# Patient Record
Sex: MALE | Race: WHITE | Employment: FULL TIME | URBAN - METROPOLITAN AREA
[De-identification: names, ages, dates, MRNs, and addresses within clinical notes are randomized per-mention and may not be internally consistent; named-entity substitution may affect disease eponyms.]

---

## 2017-09-08 PROBLEM — E78.5 HYPERLIPIDEMIA: Status: ACTIVE | Noted: 2017-09-08

## 2017-09-08 PROBLEM — I48.91 ATRIAL FIBRILLATION (HCC): Status: ACTIVE | Noted: 2017-09-08

## 2017-09-08 PROBLEM — I10 HYPERTENSION: Status: ACTIVE | Noted: 2017-09-08

## 2018-03-23 ENCOUNTER — OFFICE VISIT (OUTPATIENT)
Dept: CARDIOLOGY CLINIC | Age: 48
End: 2018-03-23
Payer: COMMERCIAL

## 2018-03-23 VITALS
HEIGHT: 68 IN | DIASTOLIC BLOOD PRESSURE: 80 MMHG | BODY MASS INDEX: 33.18 KG/M2 | RESPIRATION RATE: 16 BRPM | WEIGHT: 218.9 LBS | HEART RATE: 69 BPM | SYSTOLIC BLOOD PRESSURE: 110 MMHG

## 2018-03-23 DIAGNOSIS — I48.91 ATRIAL FIBRILLATION, UNSPECIFIED TYPE (HCC): Primary | ICD-10-CM

## 2018-03-23 DIAGNOSIS — I10 ESSENTIAL HYPERTENSION: ICD-10-CM

## 2018-03-23 DIAGNOSIS — E78.00 PURE HYPERCHOLESTEROLEMIA: ICD-10-CM

## 2018-03-23 PROCEDURE — 99213 OFFICE O/P EST LOW 20 MIN: CPT | Performed by: INTERNAL MEDICINE

## 2018-03-23 PROCEDURE — 93000 ELECTROCARDIOGRAM COMPLETE: CPT | Performed by: INTERNAL MEDICINE

## 2018-03-23 NOTE — PROGRESS NOTES
Patient Active Problem List   Diagnosis    Atrial fibrillation (HCC)    Hyperlipidemia    Hypertension       Current Outpatient Prescriptions   Medication Sig Dispense Refill    diltiazem (CARDIZEM CD) 120 MG extended release capsule Take 1 capsule by mouth daily 90 capsule 3    apixaban (ELIQUIS) 5 MG TABS tablet Take 1 tablet by mouth 2 times daily 180 tablet 3    atorvastatin (LIPITOR) 20 MG tablet Take 20 mg by mouth daily       acetaminophen (TYLENOL ARTHRITIS PAIN) 650 MG CR tablet Take 1,300 mg by mouth nightly      Cholecalciferol (VITAMIN D) 2000 UNITS CAPS capsule Take 1,000 Units by mouth daily Hold for surgery       No current facility-administered medications for this visit. CC:    Patient is seen in follow up for:  1. Atrial fibrillation, unspecified type ([address]    2. Pure hypercholesterolemia    3. Essential hypertension        HPI:  Patient is doing well without any specific cardiac problems. Patient denies any shortness of breath, chest pain, lightheadedness or dizziness. Patient is tolerating medications well without side effects. Notes he still says some occasional fluttering. Has been exercising and has lost 21 pounds. ROS:   General: No unusual weight gain, no change in exercise tolerance  Skin: No rash or itching  EENT: No vision changes or nosebleeds  Cardiovascular: No orthopnea or paroxysmal nocturnal dyspnea  Respiratory: No cough or hemoptysis  Gastrointestinal: No hematemesis or recent changes in bowel habits  Genitourinary: No hematuria, urgency or frequency  Musculoskeletal: No muscular weakness or joint swelling   Neurologic / Psychiatric: No incoordination or convulsions  Allergic / Immunologic/ Lymphatic / Endocrine: No anemia or bleeding tendency    Social History     Social History    Marital status:      Spouse name: N/A    Number of children: N/A    Years of education: N/A     Occupational History    Not on file.      Social History Main Topics hypertension       Above assessment cardiac issues stable. PLAN:   See above orders. Reviewed prior records and testing. 2/15/18 LDL noted to be 80. Assessment of medication compliance. Continue Cardizem and chronic anticoagulation. Patient scheduled to see EP and evaluation for possible ablation. Discussed issues that would prompt earlier evaluation. Follow-up office visit in 6 months.

## 2018-10-09 PROBLEM — I48.91 ATRIAL FIBRILLATION (HCC): Status: RESOLVED | Noted: 2017-09-08 | Resolved: 2018-10-09

## 2018-10-09 PROBLEM — I48.0 PAF (PAROXYSMAL ATRIAL FIBRILLATION) (HCC): Status: ACTIVE | Noted: 2018-10-09

## 2018-10-10 ENCOUNTER — OFFICE VISIT (OUTPATIENT)
Dept: CARDIOLOGY CLINIC | Age: 48
End: 2018-10-10
Payer: COMMERCIAL

## 2018-10-10 VITALS
RESPIRATION RATE: 12 BRPM | HEIGHT: 68 IN | DIASTOLIC BLOOD PRESSURE: 74 MMHG | WEIGHT: 207 LBS | BODY MASS INDEX: 31.37 KG/M2 | SYSTOLIC BLOOD PRESSURE: 128 MMHG | HEART RATE: 70 BPM

## 2018-10-10 DIAGNOSIS — E78.00 PURE HYPERCHOLESTEROLEMIA: ICD-10-CM

## 2018-10-10 DIAGNOSIS — Z53.09 CONTRAINDICATION TO ANTICOAGULATION THERAPY: ICD-10-CM

## 2018-10-10 DIAGNOSIS — I10 ESSENTIAL HYPERTENSION: ICD-10-CM

## 2018-10-10 DIAGNOSIS — I48.0 PAF (PAROXYSMAL ATRIAL FIBRILLATION) (HCC): Primary | ICD-10-CM

## 2018-10-10 PROCEDURE — 93000 ELECTROCARDIOGRAM COMPLETE: CPT | Performed by: INTERNAL MEDICINE

## 2018-10-10 PROCEDURE — 99214 OFFICE O/P EST MOD 30 MIN: CPT | Performed by: INTERNAL MEDICINE

## 2018-10-10 RX ORDER — TRIAMTERENE AND HYDROCHLOROTHIAZIDE 37.5; 25 MG/1; MG/1
CAPSULE ORAL
Refills: 3 | COMMUNITY
Start: 2018-10-02

## 2018-10-10 RX ORDER — POTASSIUM CHLORIDE 600 MG/1
TABLET, FILM COATED, EXTENDED RELEASE ORAL
Refills: 3 | COMMUNITY
Start: 2018-10-02

## 2018-10-10 NOTE — PROGRESS NOTES
Normal sinus rhythm. No change compared to prior tracing. ASSESSMENT:                                                     ORDERS:       Diagnosis Orders   1. PAF (paroxysmal atrial fibrillation) (HCC)  EKG 12 lead   2. Essential hypertension  EKG 12 lead   3. Pure hypercholesterolemia  EKG 12 lead     Above assessment cardiac issues stable. PLAN:   See above orders. Reviewed prior records and testing. 9/25/18 labs (LDL noted to be 77) / MRI  Assessment of medication compliance. Continue Cardizem. Discussed importance of prompt notification of any further a fib since anticoagulation now contraindicated. Discussed issues that would prompt earlier evaluation. Follow-up office visit in 12 months.

## 2019-10-23 ENCOUNTER — HOSPITAL ENCOUNTER (OUTPATIENT)
Age: 49
Discharge: HOME | End: 2019-10-25
Payer: COMMERCIAL

## 2019-10-23 ENCOUNTER — HOSPITAL ENCOUNTER (OUTPATIENT)
Dept: ULTRASOUND IMAGING | Age: 49
Discharge: HOME | End: 2019-10-25
Payer: COMMERCIAL

## 2019-10-23 DIAGNOSIS — R10.11 ABDOMINAL PAIN, RIGHT UPPER QUADRANT: ICD-10-CM

## 2019-10-23 PROCEDURE — 76705 ECHO EXAM OF ABDOMEN: CPT

## 2020-06-20 LAB
SARS-COV-2, PCR: NOT DETECTED
SPECIMEN SOURCE: NORMAL

## 2021-12-14 ENCOUNTER — HOSPITAL ENCOUNTER (OUTPATIENT)
Age: 51
Discharge: HOME | End: 2021-12-16
Payer: COMMERCIAL

## 2021-12-14 ENCOUNTER — HOSPITAL ENCOUNTER (OUTPATIENT)
Dept: GENERAL RADIOLOGY | Age: 51
Discharge: HOME | End: 2021-12-16
Payer: COMMERCIAL

## 2021-12-14 DIAGNOSIS — M54.12 BRACHIAL NEURITIS: ICD-10-CM

## 2021-12-14 PROCEDURE — 72050 X-RAY EXAM NECK SPINE 4/5VWS: CPT

## 2022-06-20 LAB
BACTERIA: ABNORMAL /HPF
BILIRUBIN URINE: NEGATIVE
BLOOD, URINE: NEGATIVE
CLARITY: CLEAR
COLOR: YELLOW
CRYSTALS, UA: ABNORMAL /HPF
GLUCOSE URINE: NEGATIVE MG/DL
KETONES, URINE: NEGATIVE MG/DL
LEUKOCYTE ESTERASE, URINE: NEGATIVE
NITRITE, URINE: NEGATIVE
PH UA: 6 (ref 5–9)
PROTEIN UA: NEGATIVE MG/DL
RBC UA: ABNORMAL /HPF (ref 0–2)
SPECIFIC GRAVITY UA: 1.02 (ref 1–1.03)
UROBILINOGEN, URINE: 0.2 E.U./DL
WBC UA: ABNORMAL /HPF (ref 0–5)

## 2022-06-20 PROCEDURE — 99285 EMERGENCY DEPT VISIT HI MDM: CPT

## 2022-06-20 PROCEDURE — 96374 THER/PROPH/DIAG INJ IV PUSH: CPT

## 2022-06-20 PROCEDURE — 96372 THER/PROPH/DIAG INJ SC/IM: CPT

## 2022-06-20 PROCEDURE — 81001 URINALYSIS AUTO W/SCOPE: CPT

## 2022-06-21 ENCOUNTER — HOSPITAL ENCOUNTER (EMERGENCY)
Age: 52
Discharge: HOME | End: 2022-06-21
Attending: EMERGENCY MEDICINE
Payer: COMMERCIAL

## 2022-06-21 ENCOUNTER — APPOINTMENT (OUTPATIENT)
Dept: CT IMAGING | Age: 52
End: 2022-06-21
Payer: COMMERCIAL

## 2022-06-21 VITALS
WEIGHT: 265 LBS | TEMPERATURE: 98.6 F | BODY MASS INDEX: 40.16 KG/M2 | HEART RATE: 86 BPM | OXYGEN SATURATION: 100 % | DIASTOLIC BLOOD PRESSURE: 61 MMHG | RESPIRATION RATE: 18 BRPM | HEIGHT: 68 IN | SYSTOLIC BLOOD PRESSURE: 167 MMHG

## 2022-06-21 DIAGNOSIS — I16.0 HYPERTENSIVE URGENCY: ICD-10-CM

## 2022-06-21 DIAGNOSIS — S39.012A BACK STRAIN, INITIAL ENCOUNTER: Primary | ICD-10-CM

## 2022-06-21 LAB
ALBUMIN SERPL-MCNC: 4.5 G/DL (ref 3.5–5.2)
ALP BLD-CCNC: 92 U/L (ref 40–129)
ALT SERPL-CCNC: 82 U/L (ref 0–40)
ANION GAP SERPL CALCULATED.3IONS-SCNC: 12 MMOL/L (ref 7–16)
AST SERPL-CCNC: 41 U/L (ref 0–39)
BASOPHILS ABSOLUTE: 0.08 E9/L (ref 0–0.2)
BASOPHILS RELATIVE PERCENT: 0.8 % (ref 0–2)
BILIRUB SERPL-MCNC: 0.4 MG/DL (ref 0–1.2)
BUN BLDV-MCNC: 17 MG/DL (ref 6–20)
CALCIUM SERPL-MCNC: 10.3 MG/DL (ref 8.6–10.2)
CHLORIDE BLD-SCNC: 97 MMOL/L (ref 98–107)
CO2: 28 MMOL/L (ref 22–29)
CREAT SERPL-MCNC: 1.1 MG/DL (ref 0.7–1.2)
D DIMER: 253 NG/ML DDU
EOSINOPHILS ABSOLUTE: 0.58 E9/L (ref 0.05–0.5)
EOSINOPHILS RELATIVE PERCENT: 6 % (ref 0–6)
GFR AFRICAN AMERICAN: >60
GFR NON-AFRICAN AMERICAN: >60 ML/MIN/1.73
GLUCOSE BLD-MCNC: 157 MG/DL (ref 74–99)
HCT VFR BLD CALC: 45.3 % (ref 37–54)
HEMOGLOBIN: 15 G/DL (ref 12.5–16.5)
IMMATURE GRANULOCYTES #: 0.05 E9/L
IMMATURE GRANULOCYTES %: 0.5 % (ref 0–5)
LACTIC ACID: 1.6 MMOL/L (ref 0.5–2.2)
LYMPHOCYTES ABSOLUTE: 1.46 E9/L (ref 1.5–4)
LYMPHOCYTES RELATIVE PERCENT: 15.1 % (ref 20–42)
MCH RBC QN AUTO: 29.7 PG (ref 26–35)
MCHC RBC AUTO-ENTMCNC: 33.1 % (ref 32–34.5)
MCV RBC AUTO: 89.7 FL (ref 80–99.9)
MONOCYTES ABSOLUTE: 0.91 E9/L (ref 0.1–0.95)
MONOCYTES RELATIVE PERCENT: 9.4 % (ref 2–12)
NEUTROPHILS ABSOLUTE: 6.6 E9/L (ref 1.8–7.3)
NEUTROPHILS RELATIVE PERCENT: 68.2 % (ref 43–80)
PDW BLD-RTO: 12.7 FL (ref 11.5–15)
PLATELET # BLD: 215 E9/L (ref 130–450)
PMV BLD AUTO: 9.8 FL (ref 7–12)
POTASSIUM REFLEX MAGNESIUM: 4.3 MMOL/L (ref 3.5–5)
RBC # BLD: 5.05 E12/L (ref 3.8–5.8)
SODIUM BLD-SCNC: 137 MMOL/L (ref 132–146)
TOTAL PROTEIN: 8 G/DL (ref 6.4–8.3)
WBC # BLD: 9.7 E9/L (ref 4.5–11.5)

## 2022-06-21 PROCEDURE — 85025 COMPLETE CBC W/AUTO DIFF WBC: CPT

## 2022-06-21 PROCEDURE — 83605 ASSAY OF LACTIC ACID: CPT

## 2022-06-21 PROCEDURE — 85378 FIBRIN DEGRADE SEMIQUANT: CPT

## 2022-06-21 PROCEDURE — 6360000002 HC RX W HCPCS: Performed by: STUDENT IN AN ORGANIZED HEALTH CARE EDUCATION/TRAINING PROGRAM

## 2022-06-21 PROCEDURE — 6360000004 HC RX CONTRAST MEDICATION: Performed by: RADIOLOGY

## 2022-06-21 PROCEDURE — 71275 CT ANGIOGRAPHY CHEST: CPT

## 2022-06-21 PROCEDURE — 74176 CT ABD & PELVIS W/O CONTRAST: CPT

## 2022-06-21 PROCEDURE — 80053 COMPREHEN METABOLIC PANEL: CPT

## 2022-06-21 RX ORDER — CYCLOBENZAPRINE HCL 5 MG
5 TABLET ORAL 2 TIMES DAILY PRN
Qty: 10 TABLET | Refills: 0 | Status: SHIPPED | OUTPATIENT
Start: 2022-06-21 | End: 2022-07-01

## 2022-06-21 RX ORDER — MORPHINE SULFATE 8 MG/ML
6 INJECTION, SOLUTION INTRAMUSCULAR; INTRAVENOUS ONCE
Status: DISCONTINUED | OUTPATIENT
Start: 2022-06-21 | End: 2022-06-21

## 2022-06-21 RX ORDER — ORPHENADRINE CITRATE 30 MG/ML
60 INJECTION INTRAMUSCULAR; INTRAVENOUS ONCE
Status: COMPLETED | OUTPATIENT
Start: 2022-06-21 | End: 2022-06-21

## 2022-06-21 RX ORDER — KETOROLAC TROMETHAMINE 30 MG/ML
15 INJECTION, SOLUTION INTRAMUSCULAR; INTRAVENOUS ONCE
Status: COMPLETED | OUTPATIENT
Start: 2022-06-21 | End: 2022-06-21

## 2022-06-21 RX ADMIN — KETOROLAC TROMETHAMINE 15 MG: 30 INJECTION, SOLUTION INTRAMUSCULAR at 01:30

## 2022-06-21 RX ADMIN — IOPAMIDOL 85 ML: 755 INJECTION, SOLUTION INTRAVENOUS at 02:08

## 2022-06-21 RX ADMIN — ORPHENADRINE CITRATE 60 MG: 30 INJECTION INTRAMUSCULAR; INTRAVENOUS at 03:09

## 2022-06-21 ASSESSMENT — ENCOUNTER SYMPTOMS
SINUS PAIN: 0
DIARRHEA: 0
ABDOMINAL PAIN: 0
CONSTIPATION: 0
RHINORRHEA: 0
CHEST TIGHTNESS: 0
NAUSEA: 0
SINUS PRESSURE: 0
ABDOMINAL DISTENTION: 0
COUGH: 0
ANAL BLEEDING: 0
VOMITING: 0
BACK PAIN: 0
SHORTNESS OF BREATH: 0
EYE DISCHARGE: 0

## 2022-06-21 ASSESSMENT — PAIN DESCRIPTION - LOCATION
LOCATION: BACK
LOCATION: BACK

## 2022-06-21 ASSESSMENT — PAIN SCALES - GENERAL
PAINLEVEL_OUTOF10: 8
PAINLEVEL_OUTOF10: 9

## 2022-06-21 ASSESSMENT — PAIN - FUNCTIONAL ASSESSMENT: PAIN_FUNCTIONAL_ASSESSMENT: NONE - DENIES PAIN

## 2022-06-21 NOTE — Clinical Note
Ekaterina Louis was seen and treated in our emergency department on 6/20/2022. He may return to work on 06/23/2022. If you have any questions or concerns, please don't hesitate to call.       Keo Falcon, DO

## 2022-06-21 NOTE — ED PROVIDER NOTES
HPI     Patient is a 45 y.o. male presents with a chief complaint of back pain  This has been occurring for 4 days. Patient states that it gets better with nothing. Patient states that it gets worse with nothing. Patient states that it is moderate in severity. Patient states it was gradual in onset. Patient states that he has developed right-sided back pain over the past 4 days. Patient denies any chest pain or shortness of breath. Denies any changes in urinary or bowel habits. Patient notes that it hurts to stand up. Patient has no abdominal pain or changes in urinary or bowel habits. Patient states he has never had symptoms like this before. Denies any trauma. Denies any pain going down into his leg. Review of Systems   Constitutional: Negative for activity change, appetite change, fatigue and fever. HENT: Negative for congestion, rhinorrhea, sinus pressure and sinus pain. Eyes: Negative for discharge. Respiratory: Negative for cough, chest tightness and shortness of breath. Cardiovascular: Negative for chest pain and palpitations. Gastrointestinal: Negative for abdominal distention, abdominal pain, anal bleeding, constipation, diarrhea, nausea and vomiting. Endocrine: Negative for polydipsia and polyuria. Genitourinary: Negative for decreased urine volume, difficulty urinating, enuresis, flank pain, frequency and urgency. Musculoskeletal: Negative for arthralgias, back pain and neck stiffness. Skin: Negative for rash and wound. Neurological: Negative for dizziness, weakness, light-headedness and headaches. Psychiatric/Behavioral: Negative for agitation, behavioral problems and confusion. Physical Exam  Vitals and nursing note reviewed. Constitutional:       Appearance: He is well-developed. HENT:      Head: Normocephalic and atraumatic.    Eyes:      Conjunctiva/sclera: Conjunctivae normal.   Cardiovascular:      Rate and Rhythm: Normal rate and regular rhythm. Heart sounds: Normal heart sounds. No murmur heard. Pulmonary:      Effort: Pulmonary effort is normal. No respiratory distress. Breath sounds: Normal breath sounds. No wheezing or rales. Abdominal:      General: Bowel sounds are normal.      Palpations: Abdomen is soft. Tenderness: There is no abdominal tenderness. There is no guarding or rebound. Musculoskeletal:         General: No tenderness or deformity. Cervical back: Normal range of motion and neck supple. Comments: No tenderness to palpation of the right flank. Skin:     General: Skin is warm and dry. Neurological:      Mental Status: He is alert and oriented to person, place, and time. Cranial Nerves: No cranial nerve deficit. Coordination: Coordination normal.          Procedures     [facility]     ED Course as of 06/21/22 0336   Tue Jun 21, 2022   0257 Patient was reevaluated and had significant improvement of symptoms. [JM]      ED Course User Index  [JM] Berto Carney MD      Patient is a 45 y.o. male presenting with right-sided abdominal pain. Patient had no reproducible symptoms. Patient had a D-dimer drawn because it was at the base of his back lower right ribs. D-dimer was elevated. Patient had an CTA pulm. CTA was negative. Patient was CT of the abdomen. CT was negative. Patient's symptoms are likely musculoskeletal related. Patient was given Norflex and morphine. Patient had improvement of his symptoms. Patient had a urine analysis been urine analysis was benign. Patient will be discharged home. Patient no signs of kidney stones, intra-abdominal infection, pulmonary embolism. Patient had an elevated blood pressure that was significantly improved with pain control. Patient was given return precautions. Labs were interpreted by me. Patient will follow up with their primary care provider. Patient is agreeable to this plan. Patient has remained stable throughout their stay in the ED. Patient was seen and evaluated by myself and my attending Dr. Samara Polo and Plan discussed with attending provider, please see attestation for final plan of care. This note was done using dictation software and there may be some grammatical errors associated with this. Aubree Chandler MD         ED Course as of 06/21/22 0336   Tue Jun 21, 2022   3769 Patient was reevaluated and had significant improvement of symptoms. [JM]      ED Course User Index  [JM] Aubree Chandler MD       --------------------------------------------- PAST HISTORY ---------------------------------------------  Past Medical History:  has a past medical history of Atrial fibrillation ([facility] [facility] Hyperlipidemia, and Hypertension. Past Surgical History:  has a past surgical history that includes Anterior cruciate ligament repair (age 11) and other surgical history (Left, 08/23/2016). Social History:  reports that he has never smoked. He quit smokeless tobacco use about 25 years ago. His smokeless tobacco use included snuff. He reports that he does not drink alcohol and does not use drugs. Family History: family history includes Heart Disease (age of onset: 36) in his mother; No Known Problems in his brother, father, and sister. The patients home medications have been reviewed.     Allergies: Penicillins    -------------------------------------------------- RESULTS -------------------------------------------------  Labs:  Results for orders placed or performed during the hospital encounter of 06/21/22   CBC with Auto Differential   Result Value Ref Range    WBC 9.7 4.5 - 11.5 E9/L    RBC 5.05 3.80 - 5.80 E12/L    Hemoglobin 15.0 12.5 - 16.5 g/dL    Hematocrit 45.3 37.0 - 54.0 %    MCV 89.7 80.0 - 99.9 fL    MCH 29.7 26.0 - 35.0 pg    MCHC 33.1 32.0 - 34.5 %    RDW 12.7 11.5 - 15.0 fL    Platelets 691 194 - 793 E9/L    MPV 9.8 7.0 - 12.0 fL    Neutrophils % 68.2 43.0 - 80.0 %    Immature Granulocytes % 0.5 0.0 - 5.0 %    Lymphocytes % 15.1 (L) 20.0 - 42.0 %    Monocytes % 9.4 2.0 - 12.0 %    Eosinophils % 6.0 0.0 - 6.0 %    Basophils % 0.8 0.0 - 2.0 %    Neutrophils Absolute 6.60 1.80 - 7.30 E9/L    Immature Granulocytes # 0.05 E9/L    Lymphocytes Absolute 1.46 (L) 1.50 - 4.00 E9/L    Monocytes Absolute 0.91 0.10 - 0.95 E9/L    Eosinophils Absolute 0.58 (H) 0.05 - 0.50 E9/L    Basophils Absolute 0.08 0.00 - 0.20 E9/L   Lactic Acid   Result Value Ref Range    Lactic Acid 1.6 0.5 - 2.2 mmol/L   Urinalysis with Microscopic   Result Value Ref Range    Color, UA Yellow Straw/Yellow    Clarity, UA Clear Clear    Glucose, Ur Negative Negative mg/dL    Bilirubin Urine Negative Negative    Ketones, Urine Negative Negative mg/dL    Specific Gravity, UA 1.025 1.005 - 1.030    Blood, Urine Negative Negative    pH, UA 6.0 5.0 - 9.0    Protein, UA Negative Negative mg/dL    Urobilinogen, Urine 0.2 <2.0 E.U./dL    Nitrite, Urine Negative Negative    Leukocyte Esterase, Urine Negative Negative    WBC, UA 0-1 0 - 5 /HPF    RBC, UA NONE 0 - 2 /HPF    Bacteria, UA RARE (A) None Seen /HPF    Crystals, UA Few (A) None Seen /HPF   Comprehensive Metabolic Panel w/ Reflex to MG   Result Value Ref Range    Sodium 137 132 - 146 mmol/L    Potassium reflex Magnesium 4.3 3.5 - 5.0 mmol/L    Chloride 97 (L) 98 - 107 mmol/L    CO2 28 22 - 29 mmol/L    Anion Gap 12 7 - 16 mmol/L    Glucose 157 (H) 74 - 99 mg/dL    BUN 17 6 - 20 mg/dL    CREATININE 1.1 0.7 - 1.2 mg/dL    GFR Non-African American >60 >=60 mL/min/1.73    GFR African American >60     Calcium 10.3 (H) 8.6 - 10.2 mg/dL    Total Protein 8.0 6.4 - 8.3 g/dL    Albumin 4.5 3.5 - 5.2 g/dL    Total Bilirubin 0.4 0.0 - 1.2 mg/dL    Alkaline Phosphatase 92 40 - 129 U/L    ALT 82 (H) 0 - 40 U/L    AST 41 (H) 0 - 39 U/L   D-Dimer, Quantitative   Result Value Ref Range    D-Dimer, Quant 253 ng/mL DDU       Radiology:  CT ABDOMEN PELVIS WO CONTRAST Additional Contrast? None   Final Result   No acute finding in the chest with no evidence of pulmonary embolism. Diffuse hepatic steatosis. Normal appendix. No free air or free fluid. CTA PULMONARY W CONTRAST   Final Result   No acute finding in the chest with no evidence of pulmonary embolism. Diffuse hepatic steatosis. Normal appendix. No free air or free fluid.             ------------------------- NURSING NOTES AND VITALS REVIEWED ---------------------------  Date / Time Roomed:  6/21/2022 12:23 AM  ED Bed Assignment:  15/15    The nursing notes within the ED encounter and vital signs as below have been reviewed. BP (!) 167/61   Pulse 86   Temp 98.6 °F (37 °C)   Resp 18   Ht 5' 8\" (1.727 m)   Wt 265 lb (120.2 kg)   SpO2 100%   BMI 40.29 kg/m²   Oxygen Saturation Interpretation: Normal      ------------------------------------------ PROGRESS NOTES ------------------------------------------  2:43 AM EDT  I have spoken with the patient and family if present and discussed todays results, in addition to providing specific details for the plan of care and counseling regarding the diagnosis and prognosis. Their questions are answered at this time and they are agreeable with the plan. I discussed at length with them reasons for immediate return here for re evaluation. They will followup with their primary care provider by calling their office as soon as possible.      --------------------------------- ADDITIONAL PROVIDER NOTES ---------------------------------  At this time the patient is without objective evidence of an acute process requiring hospitalization or inpatient management. They have remained hemodynamically stable throughout their entire ED visit and are stable for discharge with outpatient follow-up. The plan has been discussed in detail and they are aware of the specific conditions for emergent return, as well as the importance of follow-up.       New Prescriptions    CYCLOBENZAPRINE (FLEXERIL) 5 MG TABLET    Take 1 tablet by mouth 2 times daily as needed for Muscle spasms       Diagnosis:  1. Back strain, initial encounter    2. Hypertensive urgency        Disposition:  Patient's disposition: Discharge to home  Patient's condition is stable.        Armen Lazaro MD  Resident  06/21/22 [id number]

## 2022-06-21 NOTE — ED NOTES
Department of Emergency Medicine  FIRST PROVIDER TRIAGE NOTE             Independent MLP           6/20/22  9:53 PM EDT    Date of Encounter: 6/20/22   MRN: [id number]      HPI: Tomasz Samson is a 45 y.o. male who presents to the ED for Back Pain (back pain that started Friday)    Pt reports intermittent episodes of severe spasming to the RT flank that radiates into his RT shoulder     ROS: Negative for cp or sob. PE: Gen Appearance/Constitutional: alert  Musculoskeletal: Pt very uncomfortable, standing and refusing to sit     Initial Plan of Care: All treatment areas with department are currently occupied. Plan to order/Initiate the following while awaiting opening in ED: labs.   Initiate Treatment-Testing, Proceed toTreatment Area When Bed Available for ED Attending/MLP to Continue Care    Electronically signed by Yamile Kellogg PA-C   DD: 6/20/22         Yamile Kellogg PA-C  06/20/22 [id number]

## 2022-12-29 ENCOUNTER — HOSPITAL ENCOUNTER (OUTPATIENT)
Age: 52
Discharge: HOME | End: 2022-12-31
Payer: COMMERCIAL

## 2022-12-29 ENCOUNTER — HOSPITAL ENCOUNTER (OUTPATIENT)
Dept: GENERAL RADIOLOGY | Age: 52
Discharge: HOME | End: 2022-12-31
Payer: COMMERCIAL

## 2022-12-29 DIAGNOSIS — M79.672 LEFT FOOT PAIN: ICD-10-CM

## 2022-12-29 PROCEDURE — 73630 X-RAY EXAM OF FOOT: CPT

## 2023-09-11 ENCOUNTER — OFFICE VISIT (OUTPATIENT)
Dept: ENT CLINIC | Age: 53
End: 2023-09-11
Payer: COMMERCIAL

## 2023-09-11 VITALS
HEART RATE: 98 BPM | BODY MASS INDEX: 39.4 KG/M2 | WEIGHT: 260 LBS | HEIGHT: 68 IN | DIASTOLIC BLOOD PRESSURE: 105 MMHG | SYSTOLIC BLOOD PRESSURE: 194 MMHG | OXYGEN SATURATION: 100 %

## 2023-09-11 DIAGNOSIS — H91.8X9 ASYMMETRICAL HEARING LOSS: Primary | ICD-10-CM

## 2023-09-11 DIAGNOSIS — H90.3 BILATERAL SENSORINEURAL HEARING LOSS: ICD-10-CM

## 2023-09-11 PROCEDURE — 3077F SYST BP >= 140 MM HG: CPT | Performed by: OTOLARYNGOLOGY

## 2023-09-11 PROCEDURE — 3080F DIAST BP >= 90 MM HG: CPT | Performed by: OTOLARYNGOLOGY

## 2023-09-11 PROCEDURE — 99205 OFFICE O/P NEW HI 60 MIN: CPT | Performed by: OTOLARYNGOLOGY

## 2023-10-08 NOTE — PROGRESS NOTES
CC:   Chief Complaint   Patient presents with    Follow-up     Patient reports no changes in his dizziness and feels that his hearing has gotten worse. Reports two dizzy spells, that last about a day and a half. Anthony Rider is a 52 y.o. 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 Katt Huizar; he reports a prior history of 3 years of left ear fullness and fluctuating hearing loss; he has had some intermittent dizziness for the last 6 mo; the spinning spells last for minutes to hours and he does dry heave during the spells. He does have hypertension and is on medication and stated he was on a diuretic briefly which made him feel worse.  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

## 2023-10-09 ENCOUNTER — OFFICE VISIT (OUTPATIENT)
Dept: ENT CLINIC | Age: 53
End: 2023-10-09
Payer: COMMERCIAL

## 2023-10-09 VITALS — HEIGHT: 68 IN | BODY MASS INDEX: 39.4 KG/M2 | WEIGHT: 260 LBS

## 2023-10-09 DIAGNOSIS — H91.8X3 ASYMMETRICAL HEARING LOSS: Primary | ICD-10-CM

## 2023-10-09 DIAGNOSIS — H81.02 MENIERE'S DISEASE (COCHLEAR HYDROPS), LEFT: ICD-10-CM

## 2023-10-09 DIAGNOSIS — H90.3 BILATERAL SENSORINEURAL HEARING LOSS: ICD-10-CM

## 2023-10-09 PROCEDURE — 99214 OFFICE O/P EST MOD 30 MIN: CPT | Performed by: OTOLARYNGOLOGY

## 2023-10-31 NOTE — PROGRESS NOTES
CC:   Chief Complaint   Patient presents with    Follow-up     F/U asym. HL/Meniere's 1st injection     Margareth Mendez is a 52 y.o. 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 Taye Bean; he reports a prior history of 3 years of left ear fullness and fluctuating hearing loss; he has had some intermittent dizziness for the last 6 mo; the spinning spells last for minutes to hours and he does dry heave during the spells. He does have hypertension and is on medication and stated he was on a diuretic briefly which made him feel worse.  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

## 2023-11-02 ENCOUNTER — OFFICE VISIT (OUTPATIENT)
Dept: ENT CLINIC | Age: 53
End: 2023-11-02
Payer: COMMERCIAL

## 2023-11-02 VITALS — HEIGHT: 68 IN | WEIGHT: 260 LBS | BODY MASS INDEX: 39.4 KG/M2

## 2023-11-02 DIAGNOSIS — H90.3 BILATERAL SENSORINEURAL HEARING LOSS: ICD-10-CM

## 2023-11-02 DIAGNOSIS — H81.02 MENIERE'S DISEASE (COCHLEAR HYDROPS), LEFT: ICD-10-CM

## 2023-11-02 DIAGNOSIS — H91.8X3 ASYMMETRICAL HEARING LOSS: Primary | ICD-10-CM

## 2023-11-02 PROCEDURE — 69801 INCISE INNER EAR: CPT | Performed by: OTOLARYNGOLOGY

## 2023-11-02 PROCEDURE — 99214 OFFICE O/P EST MOD 30 MIN: CPT | Performed by: OTOLARYNGOLOGY

## 2023-11-16 ENCOUNTER — OFFICE VISIT (OUTPATIENT)
Dept: ENT CLINIC | Age: 53
End: 2023-11-16
Payer: COMMERCIAL

## 2023-11-16 VITALS — HEIGHT: 68 IN | BODY MASS INDEX: 39.4 KG/M2 | WEIGHT: 260 LBS

## 2023-11-16 DIAGNOSIS — H91.8X3 ASYMMETRICAL HEARING LOSS: Primary | ICD-10-CM

## 2023-11-16 DIAGNOSIS — H81.02 MENIERE'S DISEASE (COCHLEAR HYDROPS), LEFT: ICD-10-CM

## 2023-11-16 DIAGNOSIS — H90.3 BILATERAL SENSORINEURAL HEARING LOSS: ICD-10-CM

## 2023-11-16 PROCEDURE — 69801 INCISE INNER EAR: CPT | Performed by: OTOLARYNGOLOGY

## 2023-11-16 PROCEDURE — 99214 OFFICE O/P EST MOD 30 MIN: CPT | Performed by: OTOLARYNGOLOGY

## 2023-11-28 NOTE — PROGRESS NOTES
CC:   Chief Complaint   Patient presents with    Follow-up     Patient presents for 3rd injection for mineres treatment. Aurelio Purdy is a 52 y.o. 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 Barbra Small; he reports a prior history of 3 years of left ear fullness and fluctuating hearing loss; he has had some intermittent dizziness for the last 6 mo; the spinning spells last for minutes to hours and he does dry heave during the spells. He does have hypertension and is on medication and stated he was on a diuretic briefly which made him feel worse.  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

## 2023-11-30 ENCOUNTER — OFFICE VISIT (OUTPATIENT)
Dept: ENT CLINIC | Age: 53
End: 2023-11-30
Payer: COMMERCIAL

## 2023-11-30 VITALS — BODY MASS INDEX: 39.4 KG/M2 | WEIGHT: 260 LBS | HEIGHT: 68 IN

## 2023-11-30 DIAGNOSIS — H90.3 BILATERAL SENSORINEURAL HEARING LOSS: ICD-10-CM

## 2023-11-30 DIAGNOSIS — H81.02 MENIERE'S DISEASE (COCHLEAR HYDROPS), LEFT: ICD-10-CM

## 2023-11-30 DIAGNOSIS — H91.8X3 ASYMMETRICAL HEARING LOSS: Primary | ICD-10-CM

## 2023-11-30 PROCEDURE — 99214 OFFICE O/P EST MOD 30 MIN: CPT | Performed by: OTOLARYNGOLOGY

## 2023-11-30 PROCEDURE — 69801 INCISE INNER EAR: CPT | Performed by: OTOLARYNGOLOGY

## 2024-07-29 ENCOUNTER — HOSPITAL ENCOUNTER (INPATIENT)
Age: 54
LOS: 1 days | Discharge: HOME | End: 2024-07-30
Attending: STUDENT IN AN ORGANIZED HEALTH CARE EDUCATION/TRAINING PROGRAM | Admitting: INTERNAL MEDICINE
Payer: COMMERCIAL

## 2024-07-29 ENCOUNTER — APPOINTMENT (OUTPATIENT)
Dept: GENERAL RADIOLOGY | Age: 54
End: 2024-07-29
Payer: COMMERCIAL

## 2024-07-29 DIAGNOSIS — Z98.890 SHOULDER PAIN WITH HISTORY OF REPAIR OF ROTATOR CUFF: ICD-10-CM

## 2024-07-29 DIAGNOSIS — J81.0 ACUTE PULMONARY EDEMA (HCC): Primary | ICD-10-CM

## 2024-07-29 DIAGNOSIS — J96.01 ACUTE HYPOXIC RESPIRATORY FAILURE (HCC): ICD-10-CM

## 2024-07-29 DIAGNOSIS — M25.519 SHOULDER PAIN WITH HISTORY OF REPAIR OF ROTATOR CUFF: ICD-10-CM

## 2024-07-29 LAB
ALBUMIN SERPL-MCNC: 4.1 G/DL (ref 3.5–5.2)
ALP SERPL-CCNC: 79 U/L (ref 40–129)
ALT SERPL-CCNC: 106 U/L (ref 0–40)
ANION GAP SERPL CALCULATED.3IONS-SCNC: 17 MMOL/L (ref 7–16)
AST SERPL-CCNC: 76 U/L (ref 0–39)
B.E.: -0.4 MMOL/L (ref -3–3)
BASOPHILS # BLD: 0.06 K/UL (ref 0–0.2)
BASOPHILS NFR BLD: 0 % (ref 0–2)
BILIRUB DIRECT SERPL-MCNC: <0.2 MG/DL (ref 0–0.3)
BILIRUB INDIRECT SERPL-MCNC: ABNORMAL MG/DL (ref 0–1)
BILIRUB SERPL-MCNC: 0.5 MG/DL (ref 0–1.2)
BNP SERPL-MCNC: 339 PG/ML (ref 0–125)
BUN SERPL-MCNC: 14 MG/DL (ref 6–20)
CALCIUM SERPL-MCNC: 8.6 MG/DL (ref 8.6–10.2)
CHLORIDE SERPL-SCNC: 99 MMOL/L (ref 98–107)
CO2 SERPL-SCNC: 20 MMOL/L (ref 22–29)
COHB: 1.1 % (ref 0–1.5)
CREAT SERPL-MCNC: 0.9 MG/DL (ref 0.7–1.2)
CRITICAL ACTION: NORMAL
CRITICAL NOTIFICATION DATE/TIME: NORMAL
CRITICAL NOTIFICATION: NORMAL
CRITICAL VALUE READ BACK: YES
CRITICAL: ABNORMAL
DATE ANALYZED: ABNORMAL
DATE OF COLLECTION: ABNORMAL
EOSINOPHIL # BLD: 0.1 K/UL (ref 0.05–0.5)
EOSINOPHILS RELATIVE PERCENT: 1 % (ref 0–6)
ERYTHROCYTE [DISTWIDTH] IN BLOOD BY AUTOMATED COUNT: 12.5 % (ref 11.5–15)
FIO2: 70 %
GFR, ESTIMATED: >90 ML/MIN/1.73M2
GLUCOSE BLD-MCNC: 262 MG/DL (ref 74–99)
GLUCOSE SERPL-MCNC: 305 MG/DL (ref 74–99)
HCO3: 22.8 MMOL/L (ref 22–26)
HCT VFR BLD AUTO: 53.7 % (ref 37–54)
HGB BLD-MCNC: 16.4 G/DL (ref 12.5–16.5)
HHB: 5 % (ref 0–5)
IMM GRANULOCYTES # BLD AUTO: 0.07 K/UL (ref 0–0.58)
IMM GRANULOCYTES NFR BLD: 1 % (ref 0–5)
LAB: ABNORMAL
LYMPHOCYTES NFR BLD: 1.02 K/UL (ref 1.5–4)
LYMPHOCYTES RELATIVE PERCENT: 7 % (ref 20–42)
Lab: 1515
MCH RBC QN AUTO: 29.8 PG (ref 26–35)
MCHC RBC AUTO-ENTMCNC: 30.5 G/DL (ref 32–34.5)
MCV RBC AUTO: 97.6 FL (ref 80–99.9)
METHB: 0.3 % (ref 0–1.5)
MODE: AC
MONOCYTES NFR BLD: 0.45 K/UL (ref 0.1–0.95)
MONOCYTES NFR BLD: 3 % (ref 2–12)
NEGATIVE BASE EXCESS, ART: 4.6 MMOL/L
NEUTROPHILS NFR BLD: 88 % (ref 43–80)
NEUTS SEG NFR BLD: 12.47 K/UL (ref 1.8–7.3)
O2 CONTENT: 22.1 ML/DL
O2 SATURATION: 94.9 % (ref 92–98.5)
O2HB: 93.6 % (ref 94–97)
OPERATOR ID: 1741
PATIENT TEMP: 37 C
PCO2: 33.8 MMHG (ref 35–45)
PEEP/CPAP: 12 CMH2O
PFO2: 1 MMHG/%
PH BLOOD GAS: 7.45 (ref 7.35–7.45)
PLATELET # BLD AUTO: 262 K/UL (ref 130–450)
PMV BLD AUTO: 10.1 FL (ref 7–12)
PO2: 70.3 MMHG (ref 75–100)
POC HCO3: 26.4 MMOL/L (ref 22–26)
POC O2 SATURATION: 84.4 % (ref 92–98.5)
POC PCO2: 72.7 MM HG (ref 35–45)
POC PH: 7.17 (ref 7.35–7.45)
POC PO2: 63.8 MM HG (ref 80–100)
POTASSIUM SERPL-SCNC: 4.5 MMOL/L (ref 3.5–5)
PROT SERPL-MCNC: 8 G/DL (ref 6.4–8.3)
RBC # BLD AUTO: 5.5 M/UL (ref 3.8–5.8)
RI(T): 5.58
RR MECHANICAL: 24 B/MIN
SODIUM SERPL-SCNC: 136 MMOL/L (ref 132–146)
SOURCE, BLOOD GAS: ABNORMAL
THB: 16.8 G/DL (ref 11.5–16.5)
TIME ANALYZED: 1525
VT MECHANICAL: 400 ML
WBC OTHER # BLD: 14.2 K/UL (ref 4.5–11.5)

## 2024-07-29 PROCEDURE — 96375 TX/PRO/DX INJ NEW DRUG ADDON: CPT

## 2024-07-29 PROCEDURE — 82803 BLOOD GASES ANY COMBINATION: CPT

## 2024-07-29 PROCEDURE — 6360000002 HC RX W HCPCS: Performed by: STUDENT IN AN ORGANIZED HEALTH CARE EDUCATION/TRAINING PROGRAM

## 2024-07-29 PROCEDURE — 85025 COMPLETE CBC W/AUTO DIFF WBC: CPT

## 2024-07-29 PROCEDURE — 82962 GLUCOSE BLOOD TEST: CPT

## 2024-07-29 PROCEDURE — 2000000000 HC ICU R&B

## 2024-07-29 PROCEDURE — 80053 COMPREHEN METABOLIC PANEL: CPT

## 2024-07-29 PROCEDURE — 83880 ASSAY OF NATRIURETIC PEPTIDE: CPT

## 2024-07-29 PROCEDURE — 82805 BLOOD GASES W/O2 SATURATION: CPT

## 2024-07-29 PROCEDURE — 6360000002 HC RX W HCPCS

## 2024-07-29 PROCEDURE — 99285 EMERGENCY DEPT VISIT HI MDM: CPT

## 2024-07-29 PROCEDURE — 5A1935Z RESPIRATORY VENTILATION, LESS THAN 24 CONSECUTIVE HOURS: ICD-10-PCS | Performed by: INTERNAL MEDICINE

## 2024-07-29 PROCEDURE — 71045 X-RAY EXAM CHEST 1 VIEW: CPT

## 2024-07-29 PROCEDURE — 2500000003 HC RX 250 WO HCPCS: Performed by: STUDENT IN AN ORGANIZED HEALTH CARE EDUCATION/TRAINING PROGRAM

## 2024-07-29 PROCEDURE — 96365 THER/PROPH/DIAG IV INF INIT: CPT

## 2024-07-29 PROCEDURE — 6370000000 HC RX 637 (ALT 250 FOR IP)

## 2024-07-29 PROCEDURE — 94002 VENT MGMT INPAT INIT DAY: CPT

## 2024-07-29 PROCEDURE — 87081 CULTURE SCREEN ONLY: CPT

## 2024-07-29 PROCEDURE — 82248 BILIRUBIN DIRECT: CPT

## 2024-07-29 PROCEDURE — 93005 ELECTROCARDIOGRAM TRACING: CPT | Performed by: STUDENT IN AN ORGANIZED HEALTH CARE EDUCATION/TRAINING PROGRAM

## 2024-07-29 RX ORDER — FENTANYL CITRATE-0.9 % NACL/PF 20 MCG/2ML
50 SYRINGE (ML) INTRAVENOUS EVERY 30 MIN PRN
Status: DISCONTINUED | OUTPATIENT
Start: 2024-07-29 | End: 2024-07-29

## 2024-07-29 RX ORDER — PROPOFOL 10 MG/ML
5-50 INJECTION, EMULSION INTRAVENOUS CONTINUOUS
Status: DISCONTINUED | OUTPATIENT
Start: 2024-07-29 | End: 2024-07-29

## 2024-07-29 RX ORDER — ETOMIDATE 2 MG/ML
20 INJECTION INTRAVENOUS ONCE
Status: COMPLETED | OUTPATIENT
Start: 2024-07-29 | End: 2024-07-29

## 2024-07-29 RX ORDER — ONDANSETRON 2 MG/ML
4 INJECTION INTRAMUSCULAR; INTRAVENOUS EVERY 6 HOURS PRN
Status: DISCONTINUED | OUTPATIENT
Start: 2024-07-29 | End: 2024-07-30 | Stop reason: HOSPADM

## 2024-07-29 RX ORDER — ACETAMINOPHEN 325 MG/1
650 TABLET ORAL EVERY 4 HOURS PRN
Status: DISCONTINUED | OUTPATIENT
Start: 2024-07-29 | End: 2024-07-30 | Stop reason: HOSPADM

## 2024-07-29 RX ORDER — FUROSEMIDE 10 MG/ML
20 INJECTION INTRAMUSCULAR; INTRAVENOUS ONCE
Status: COMPLETED | OUTPATIENT
Start: 2024-07-29 | End: 2024-07-29

## 2024-07-29 RX ORDER — PROPOFOL 10 MG/ML
INJECTION, EMULSION INTRAVENOUS
Status: COMPLETED
Start: 2024-07-29 | End: 2024-07-29

## 2024-07-29 RX ORDER — FENTANYL CITRATE-0.9 % NACL/PF 10 MCG/ML
25-200 PLASTIC BAG, INJECTION (ML) INTRAVENOUS CONTINUOUS
Status: DISCONTINUED | OUTPATIENT
Start: 2024-07-29 | End: 2024-07-29

## 2024-07-29 RX ADMIN — ACETAMINOPHEN 650 MG: 325 TABLET ORAL at 22:49

## 2024-07-29 RX ADMIN — Medication 50 MCG: at 12:29

## 2024-07-29 RX ADMIN — PROPOFOL 40 MCG/KG/MIN: 10 INJECTION, EMULSION INTRAVENOUS at 12:26

## 2024-07-29 RX ADMIN — Medication 50 MCG/HR: at 12:27

## 2024-07-29 RX ADMIN — ONDANSETRON 4 MG: 2 INJECTION INTRAMUSCULAR; INTRAVENOUS at 18:22

## 2024-07-29 RX ADMIN — PROPOFOL 40 MCG/KG/MIN: 10 INJECTION, EMULSION INTRAVENOUS at 14:42

## 2024-07-29 RX ADMIN — ETOMIDATE 20 MG: 2 INJECTION, SOLUTION INTRAVENOUS at 12:24

## 2024-07-29 RX ADMIN — FUROSEMIDE 20 MG: 10 INJECTION, SOLUTION INTRAMUSCULAR; INTRAVENOUS at 13:41

## 2024-07-29 ASSESSMENT — PULMONARY FUNCTION TESTS
PIF_VALUE: 29
PIF_VALUE: 20
PIF_VALUE: 28
PIF_VALUE: 21
PIF_VALUE: 12
PIF_VALUE: 17
PIF_VALUE: 17
PIF_VALUE: 28
PIF_VALUE: 30

## 2024-07-29 ASSESSMENT — PAIN SCALES - WONG BAKER: WONGBAKER_NUMERICALRESPONSE: NO HURT

## 2024-07-29 ASSESSMENT — PAIN SCALES - GENERAL
PAINLEVEL_OUTOF10: 4
PAINLEVEL_OUTOF10: 0

## 2024-07-29 ASSESSMENT — PAIN DESCRIPTION - DESCRIPTORS: DESCRIPTORS: ACHING

## 2024-07-29 ASSESSMENT — PAIN DESCRIPTION - LOCATION: LOCATION: SHOULDER

## 2024-07-29 ASSESSMENT — PAIN DESCRIPTION - ORIENTATION: ORIENTATION: RIGHT

## 2024-07-29 NOTE — PROGRESS NOTES
07/29/24 1558   Weaning Parameters   Spontaneous Breathing Trial Complete Yes   Respiratory Rate Observed 27   Ve 8.5      RSBI 85   NIF -47

## 2024-07-29 NOTE — PROGRESS NOTES
07/29/24 1523   Patient Observation   Pulse 91   Respirations 24   SpO2 93 %   Vent Information   Vent Mode (S)  CPAP/PS   Ventilator Settings   FiO2  (S)  50 %   PEEP/CPAP (cmH2O) (S)  8   Pressure Support (cm H2O) (S)  12 cm H2O   Vent Patient Data (Readings)   Vt (Measured) 461 mL   Peak Inspiratory Pressure (cmH2O) 21 cmH2O   Rate Measured 23 br/min   Minute Volume (L/min) 10.7 Liters   Mean Airway Pressure (cmH2O) 13 cmH20   Plateau Pressure (cm H2O) 28 cm H2O   Driving Pressure 20   I:E Ratio 1:1.90   Vent Alarm Settings   High Pressure (cmH2O) 40 cmH2O   Low Exhaled Vt (ml) 0.46 mL

## 2024-07-29 NOTE — ED NOTES
Patient arrived from orthopedic center by ambulance. Following surgery they attempted to extubate but had to re intubated due to destat in respiratory status. Pt arrived intubated and was being bagged. Received 2mg versed at surgery center but patient still agitated.   Dr. Sam, monica RN, Elizabeth little RN, Mónica RN,  [facility] pharmacy at bedside.  1150 20mg etomidate given by alec   1150 etCO2 57, 88%, , /94  1158 Propofol started at 20/mcg/kg/min by alec, verbal order obtained from Dr. Sam  1158 vitals;  94% vent, RR 24 /92  1200 propofol 25mcg/kg/min verbal order obtained from Dr. Sam  1202 Chest Xray preformed.   1206 vent settings RR 25, Flow rate 40, Peep 12, 95%O2  1206 propofol 30mcg/kg/min. Pt still agitated.   1212 propofol 35mg/kg/ min

## 2024-07-29 NOTE — CONSULTS
Critical Care Admit/Consult Note         Patient - Jordi Burroughs   MRN -  [id number]   [facility] [id number]   - 1970      Date of Admission -  2024 11:50 AM  Date of evaluation -  2024/020-A   Hospital Day - 0            ADMIT/CONSULT DETAILS     Reason for Admit/Consult   Postoperative hypoxic and hypercapnic respiratory    Consulting Service/Physician   Consulting - Clarisa Lima MD  Primary Care Physician - Juan Fisher MD HPI   The patient is a 54 y.o. 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    Per report from the emergency room they had attempted to extubate patient postoperatively and he had become hypoxic.  And they had reintubated him and transferred to our emergency room.    Chest x-ray in the emergency room showed bilateral pulmonary edema.  Initial ABG revealed acute hypoxic and hypercapnic respiratory failure with a pH of 7.168/pCO2 72.7/PaO2 of 63.8/bicarb of 26.4    Patient was transferred to the ICU for ongoing care.      Past Medical History         Diagnosis Date    Atrial fibrillation (HCC)     Cancer (HCC)     lt ear basal cell    Hyperlipidemia     Hypertension         Past Surgical History           Procedure Laterality Date    ANTERIOR CRUCIATE LIGAMENT REPAIR  age 13    right    OTHER SURGICAL HISTORY Left 2016    excision of left poat auricular basal cell carcanoma              Current Medications   Current Medications     fentaNYL **AND** fentaNYL  IV Drips/Infusions   propofol 40 mcg/kg/min (24 1442)    fentaNYL Stopped (24 1506)     Home Medications  Medications Prior to Admission: potassium chloride (KLOR-CON) 8 MEQ extended release tablet,   diltiazem (CARDIZEM CD) 120 MG  No adenopathy.  Vascular calcification.  No abdominal aortic aneurysm.     Bones/Soft Tissues: Multilevel lumbar spondylosis, most prominent at L4-L5.  Mild osteoarthritis of the bilateral hip joints.     IMPRESSION:  No acute finding in the chest with no evidence of pulmonary embolism.     Diffuse hepatic steatosis.     Normal appendix.     No free air or free fluid.               SYSTEMS ASSESSMENT    Neuro   Awake alert follows commands no acute issue    Respiratory   Postoperative acute hypoxic and hypercapnic failure most likely secondary not complete reversal from anesthesia and having underlying LEMUEL    Repeat ABG shows significant improvement in acidosis.  Placed patient on pressure support hopefully we can extubate him within the next hour    Cardiovascular   History of A-fib    Patient reported that he was not taking Eliquis.  Will resume his Cardizem once extubated.    Gastrointestinal     Has elevated transaminases.  With normal bilirubin.  Possible second to Lipitor?  Will continue to monitor    Renal     No acute issues     Infectious Disease     No acute issues    Hematology/Oncology     Leukocytosis which could be reactive  Slight erythrocytosis most likely underlying LEMUEL and possible nocturnal hypoxia  Patient does have a CPAP and is compliant with it.    Endocrine     Monitor blood glucose per protocol    Social/Spiritual/DNR/Other   Patient is a full code    MECRED    \"Thank you for asking us to see this complex patient.\"    Total critical care time caring for this patient with life threatening, unstable organ failure, including direct patient contact, management of life support systems, review of data including imaging and labs, discussions with other team members and physicians at least 35 Min so far today, excluding procedures. Please feel free to call with questions or concerns.     Gely Art MD

## 2024-07-29 NOTE — ED PROVIDER NOTES
[facility] EMERGENCY DEPARTMENT  EMERGENCY DEPARTMENT ENCOUNTER        Pt Name: Jordi Burroughs  MRN: [id number]  Birthdate 1970  Date of evaluation: 7/29/2024  Provider: Gino Sam MD  PCP: Juan Fisher MD  Note Started: 11:58 AM EDT 7/29/24    CHIEF COMPLAINT       Chief Complaint   Patient presents with    Respiratory Arrest       HISTORY OF PRESENT ILLNESS: 1 or more Elements        Limitations to history : None    Jordi Burroughs is a 54 y.o. male who presents to the emergency department for a post anesthesia shoulder surgery and hypoxia that required reintubation.  Arrives to the emergency department with an ET tube in place, history unable to provided by patient.  History provided by EMS.  Patient was receiving a shoulder surgery, apparently when they went to extubate him he was hypoxic requiring reintubation.  He arrives here awake, as the sedation he received prior to transport has worn off, he is attempting to reach towards his tube, his eyes are open.  GCS 9 T.    Nursing Notes were all reviewed and agreed with or any disagreements were addressed in the HPI.      REVIEW OF EXTERNAL NOTE :       Office visit 10/10/2018 patient is a history of paroxysmal A-fib    REVIEW OF SYSTEMS :      Positives and Pertinent negatives as per HPI.     SURGICAL HISTORY     Past Surgical History:   Procedure Laterality Date    ANTERIOR CRUCIATE LIGAMENT REPAIR  age 13    right    OTHER SURGICAL HISTORY Left 08/23/2016    excision of left poat auricular basal cell carcanoma        CURRENTMEDICATIONS       Previous Medications    ACETAMINOPHEN (TYLENOL ARTHRITIS PAIN) 650 MG CR TABLET    Take 2 tablets by mouth nightly    APIXABAN (ELIQUIS) 5 MG TABS TABLET    Take 1 tablet by mouth 2 times daily    ATORVASTATIN (LIPITOR) 20 MG TABLET    Take 1 tablet by mouth daily    CHOLECALCIFEROL (VITAMIN D) 2000 UNITS CAPS CAPSULE    Take 1,000 Units by mouth daily Hold for surgery     believes it is likely pulmonary edema.  She will except to the intensive care unit. [JG]   1311 54-year-old male presenting to the emergency department for acute hypoxemic respiratory failure.  Patient was at an outpatient surgical center for rotator cuff surgery today, was being extubated became hypoxic requiring reintubation.  Upon arrival to the emergency department arrived with a GCS of 9T with diminished breath sounds and hypoxemia despite being on a ventilator.  Placed him on high PEEP high FiO2 settings for concern for pneumonitis versus ARDS versus pulmonary edema.  Was given a dose of Lasix for suspected pulmonary edema.  He was placed on propofol, and fentanyl for sedation.  Considered MI, EKG was not consistent with this.  Considered electrolyte abnormality, BMP ordered.  Considered anemia, CBC was not consistent with this.  Patient admitted to the intensive care unit for further workup and management. [JG]      ED Course User Index  [JG] Gino Sam MD       Medical Decision Making  Problems Addressed:  Acute hypoxic respiratory failure (HCC): acute illness or injury  Acute pulmonary edema (HCC): acute illness or injury    Amount and/or Complexity of Data Reviewed  External Data Reviewed: notes.  Labs: ordered. Decision-making details documented in ED Course.  Radiology: ordered and independent interpretation performed. Decision-making details documented in ED Course.  ECG/medicine tests: ordered and independent interpretation performed. Decision-making details documented in ED Course.    Risk  Prescription drug management.  Decision regarding hospitalization.              CONSULTS: (Who and What was discussed)  IP CONSULT TO INTERNAL MEDICINE  IP CONSULT TO CRITICAL CARE        I am the Primary Clinician of Record.    FINAL IMPRESSION      1. Acute pulmonary edema (HCC)    2. Acute hypoxic respiratory failure (HCC)          DISPOSITION/PLAN     DISPOSITION Admitted 07/29/2024 01:11:27

## 2024-07-29 NOTE — PROGRESS NOTES
07/29/24 1200   Patient Observation   Pulse (!) 121   Respirations 30   SpO2 95 %   Breath Sounds   Breath Sounds Bilateral Diminished   Right Upper Lobe Diminished   Right Middle Lobe Diminished   Right Lower Lobe Diminished   Left Upper Lobe Diminished   Left Lower Lobe Diminished   Vent Information   Ventilator Day(s) 1   Ventilator ID [id number]   Ventilator Safety Check Performed Pre-Use Yes   Equipment Changed HME   Ventilator Initiate Yes   Vent Mode AC/VC   $Ventilation $Initial Day   Ventilator Settings   FiO2  100 %   Insp Time (sec) 0.6 sec   Vt (Set, mL) 400 mL   Resp Rate (Set) 24 bpm   PEEP/CPAP (cmH2O) 12   Vent Patient Data (Readings)   Vt (Measured) 400 mL   Peak Inspiratory Pressure (cmH2O) 30 cmH2O   Rate Measured 55 br/min   Minute Volume (L/min) 19.6 Liters   Mean Airway Pressure (cmH2O) 21 cmH20   I Time/ I Time % 0.6 s   Backup Apnea On   Vent Alarm Settings   Low Pressure (cmH2O) 15 cmH2O   High Pressure (cmH2O) 55 cmH2O   Low Minute Volume (lpm) 4 L/min   High Minute Volume (lpm) 26 L/min   RR High (bpm) 60 br/min   Apnea (secs) 20 secs   Additional Respiratoray Assessments   Humidification Source HME   Airway Clearance   Suction ET Tube   Suction Device Inline suction catheter   Sputum Method Obtained Endotracheal   Sputum Amount Small   Sputum Color/Odor Yellow   Sputum Consistency Thin   ETT    Placement Date/Time: 07/29/24 1204   Present on Admission/Arrival: Yes  Placement Verified By: Auscultation  Airway Type: Cuffed  Airway Tube Size: 7.5 mm  Location: Oral  Secured At: 25 cm  Measured From: Lips   Secured At 25 cm   Measured From Lips   ETT Placement Center   Secured By Commercial tube knott

## 2024-07-29 NOTE — PROGRESS NOTES
07/29/24 1623   Patient Observation   Observations vorb Extubate from [facility] ventilation Dr Guillen to RT. pt sxd orally .  balloon deflated . oet removed . pt verbalized name clear strong verbal sounds/voice. as ditected  Dr Guillen to Rt pt placed on his Hpap with nasal pillows ,with  6 lpm oxygen added inline spo2 92 via ox. resp rate 26, breath sounds equal clear no stridor noted at this time.

## 2024-07-29 NOTE — PROGRESS NOTES
Patient admitted from ER to , with the following belongings; SHORTS SHOES SHIRT CPAP, placed on monitor, patient oriented to room and unit visiting hours.  Patient guide at bedside, reviewed patient rights and responsibilities. MRSA nasal swab obtained.  Bed alarm on.  Call light within reach.

## 2024-07-29 NOTE — PROGRESS NOTES
07/29/24 1432   Patient Observation   Pulse 88   Respirations 27   SpO2 98 %   Breath Sounds   Breath Sounds Bilateral Diminished   Right Upper Lobe Diminished   Right Middle Lobe Diminished   Right Lower Lobe Diminished   Left Upper Lobe Diminished   Left Lower Lobe Diminished   Vent Information   Ventilator Day(s) 1   Ventilator ID [id number]   Equipment Changed Humidification   Vent Mode AC/PRVC   Ventilator Settings   FiO2  (S)  70 %  (rt titrated)   Insp Time (sec) 0.7 sec   Vt (Set, mL) 400 mL   Resp Rate (Set) 24 bpm   PEEP/CPAP (cmH2O) 12   Vent Patient Data (Readings)   Vt (Measured) 221 mL   Peak Inspiratory Pressure (cmH2O) 29 cmH2O   Rate Measured 27 br/min   Minute Volume (L/min) 10.6 Liters   Mean Airway Pressure (cmH2O) 18 cmH20   Plateau Pressure (cm H2O) 28 cm H2O   Driving Pressure 16   I:E Ratio 2.70:1   Static Compliance (L/cm H2O) 13   Backup Apnea On   Backup Rate 24 Breaths Per Minute   Backup Vt 400   Vent Alarm Settings   High Pressure (cmH2O) 40 cmH2O   Low Minute Volume (lpm) 5 L/min   High Minute Volume (lpm) 16 L/min   Low Exhaled Vt (ml) 300 mL   High Exhaled Vt (ml) 800 mL   RR Low (bpm) 24   RR High (bpm) 35 br/min   Apnea (secs) 20 secs   Additional Respiratoray Assessments   Humidification Source Heated wire   Humidification Temp 37   Ambu Bag With Mask At Bedside Yes   Airway Clearance   Suction ET Tube   Suction Device Inline suction catheter   Sputum Method Obtained Endotracheal   Sputum Amount Small   Sputum Color/Odor Pink tinged   Sputum Consistency Frothy   ETT    Placement Date/Time: 07/29/24 1204   Present on Admission/Arrival: Yes  Placement Verified By: Auscultation  Airway Type: Cuffed  Airway Tube Size: 7.5 mm  Location: Oral  Secured At: 25 cm  Measured From: Lips   Secured At 25 cm   Measured From Lips   ETT Placement Center   Secured By Commercial tube knott

## 2024-07-29 NOTE — PROGRESS NOTES
07/29/24 1604   Patient Observation   Observations oet leak test . pt sxd orally and oet.  balloon deflted insp vt 700 given and exh vt 270-310 measured in return over a couple breaths, audible airflow heard orally . therfore leak present .  balloon reinflated . pt placed back on psv results shown to Dr Ware.

## 2024-07-29 NOTE — PROGRESS NOTES
4 Eyes Skin Assessment     NAME:  Jordi Burroughs  YOB: 1970  MEDICAL RECORD NUMBER:  [id number]    The patient is being assessed for  Admission    I agree that at least one RN has performed a thorough Head to Toe Skin Assessment on the patient. ALL assessment sites listed below have been assessed.      Areas assessed by both nurses:    Head, Face, Ears, Shoulders, Back, Chest, Arms, Elbows, Hands, Sacrum. Buttock, Coccyx, Ischium, Legs. Feet and Heels, and Under Medical Devices         Does the Patient have a Wound? R shoulder surgery incision.       Beltran Prevention initiated by RN: Yes  Wound Care Orders initiated by RN: No    Pressure Injury (Stage 3,4, Unstageable, DTI, NWPT, and Complex wounds) if present, place Wound referral order by RN under : No    New Ostomies, if present place, Ostomy referral order under : No     Nurse 1 eSignature: Electronically signed by Lidia Sharp RN on 7/29/24 at 2:48 PM EDT    **SHARE this note so that the co-signing nurse can place an eSignature**    Nurse 2 eSignature: Electronically signed by Iam Felder RN on 7/29/24 at 2:49 PM EDT

## 2024-07-30 VITALS
OXYGEN SATURATION: 98 % | DIASTOLIC BLOOD PRESSURE: 82 MMHG | TEMPERATURE: 98 F | RESPIRATION RATE: 20 BRPM | HEART RATE: 76 BPM | BODY MASS INDEX: 40.86 KG/M2 | SYSTOLIC BLOOD PRESSURE: 149 MMHG | WEIGHT: 269.62 LBS | HEIGHT: 68 IN

## 2024-07-30 LAB
ALBUMIN SERPL-MCNC: 3.8 G/DL (ref 3.5–5.2)
ALP SERPL-CCNC: 63 U/L (ref 40–129)
ALT SERPL-CCNC: 76 U/L (ref 0–40)
ANION GAP SERPL CALCULATED.3IONS-SCNC: 10 MMOL/L (ref 7–16)
AST SERPL-CCNC: 42 U/L (ref 0–39)
B.E.: 6.6 MMOL/L (ref -3–3)
BASOPHILS # BLD: 0.02 K/UL (ref 0–0.2)
BASOPHILS NFR BLD: 0 % (ref 0–2)
BILIRUB SERPL-MCNC: 0.5 MG/DL (ref 0–1.2)
BUN SERPL-MCNC: 13 MG/DL (ref 6–20)
CALCIUM SERPL-MCNC: 8.8 MG/DL (ref 8.6–10.2)
CHLORIDE SERPL-SCNC: 102 MMOL/L (ref 98–107)
CO2 SERPL-SCNC: 28 MMOL/L (ref 22–29)
COHB: 0.8 % (ref 0–1.5)
CREAT SERPL-MCNC: 1.1 MG/DL (ref 0.7–1.2)
CRITICAL: ABNORMAL
DATE ANALYZED: ABNORMAL
DATE OF COLLECTION: ABNORMAL
EKG ATRIAL RATE: 92 BPM
EKG P AXIS: 65 DEGREES
EKG P-R INTERVAL: 154 MS
EKG Q-T INTERVAL: 376 MS
EKG QRS DURATION: 94 MS
EKG QTC CALCULATION (BAZETT): 464 MS
EKG R AXIS: 78 DEGREES
EKG T AXIS: 55 DEGREES
EKG VENTRICULAR RATE: 92 BPM
EOSINOPHIL # BLD: 0.01 K/UL (ref 0.05–0.5)
EOSINOPHILS RELATIVE PERCENT: 0 % (ref 0–6)
ERYTHROCYTE [DISTWIDTH] IN BLOOD BY AUTOMATED COUNT: 12.3 % (ref 11.5–15)
GFR, ESTIMATED: 80 ML/MIN/1.73M2
GLUCOSE SERPL-MCNC: 164 MG/DL (ref 74–99)
HCO3: 30.9 MMOL/L (ref 22–26)
HCT VFR BLD AUTO: 45.3 % (ref 37–54)
HGB BLD-MCNC: 15 G/DL (ref 12.5–16.5)
HHB: 2.6 % (ref 0–5)
IMM GRANULOCYTES # BLD AUTO: 0.05 K/UL (ref 0–0.58)
IMM GRANULOCYTES NFR BLD: 0 % (ref 0–5)
LAB: ABNORMAL
LYMPHOCYTES NFR BLD: 0.71 K/UL (ref 1.5–4)
LYMPHOCYTES RELATIVE PERCENT: 5 % (ref 20–42)
Lab: 657
MAGNESIUM SERPL-MCNC: 1.7 MG/DL (ref 1.6–2.6)
MCH RBC QN AUTO: 29.4 PG (ref 26–35)
MCHC RBC AUTO-ENTMCNC: 33.1 G/DL (ref 32–34.5)
MCV RBC AUTO: 88.8 FL (ref 80–99.9)
METHB: 0.3 % (ref 0–1.5)
MODE: ABNORMAL
MONOCYTES NFR BLD: 0.98 K/UL (ref 0.1–0.95)
MONOCYTES NFR BLD: 7 % (ref 2–12)
NEUTROPHILS NFR BLD: 87 % (ref 43–80)
NEUTS SEG NFR BLD: 11.94 K/UL (ref 1.8–7.3)
O2 CONTENT: 20.8 ML/DL
O2 SATURATION: 97.4 % (ref 92–98.5)
O2HB: 96.3 % (ref 94–97)
OPERATOR ID: ABNORMAL
PATIENT TEMP: 37 C
PCO2: 43 MMHG (ref 35–45)
PH BLOOD GAS: 7.47 (ref 7.35–7.45)
PHOSPHATE SERPL-MCNC: 3.1 MG/DL (ref 2.5–4.5)
PLATELET # BLD AUTO: 230 K/UL (ref 130–450)
PMV BLD AUTO: 10.1 FL (ref 7–12)
PO2: 90.5 MMHG (ref 75–100)
POTASSIUM SERPL-SCNC: 4 MMOL/L (ref 3.5–5)
PROT SERPL-MCNC: 7.1 G/DL (ref 6.4–8.3)
RBC # BLD AUTO: 5.1 M/UL (ref 3.8–5.8)
SODIUM SERPL-SCNC: 140 MMOL/L (ref 132–146)
SOURCE, BLOOD GAS: ABNORMAL
THB: 15.3 G/DL (ref 11.5–16.5)
TIME ANALYZED: 710
WBC OTHER # BLD: 13.7 K/UL (ref 4.5–11.5)

## 2024-07-30 PROCEDURE — 80053 COMPREHEN METABOLIC PANEL: CPT

## 2024-07-30 PROCEDURE — 6370000000 HC RX 637 (ALT 250 FOR IP): Performed by: INTERNAL MEDICINE

## 2024-07-30 PROCEDURE — 84100 ASSAY OF PHOSPHORUS: CPT

## 2024-07-30 PROCEDURE — 93010 ELECTROCARDIOGRAM REPORT: CPT | Performed by: INTERNAL MEDICINE

## 2024-07-30 PROCEDURE — 6370000000 HC RX 637 (ALT 250 FOR IP)

## 2024-07-30 PROCEDURE — 85025 COMPLETE CBC W/AUTO DIFF WBC: CPT

## 2024-07-30 PROCEDURE — 82805 BLOOD GASES W/O2 SATURATION: CPT

## 2024-07-30 PROCEDURE — 83735 ASSAY OF MAGNESIUM: CPT

## 2024-07-30 PROCEDURE — 6360000002 HC RX W HCPCS: Performed by: INTERNAL MEDICINE

## 2024-07-30 RX ORDER — HYDROCODONE BITARTRATE AND ACETAMINOPHEN 5; 325 MG/1; MG/1
1 TABLET ORAL EVERY 6 HOURS PRN
Qty: 9 TABLET | Refills: 0 | Status: SHIPPED | OUTPATIENT
Start: 2024-07-30 | End: 2024-08-02

## 2024-07-30 RX ORDER — DILTIAZEM HYDROCHLORIDE 120 MG/1
120 CAPSULE, COATED, EXTENDED RELEASE ORAL DAILY
Status: DISCONTINUED | OUTPATIENT
Start: 2024-07-30 | End: 2024-07-30 | Stop reason: HOSPADM

## 2024-07-30 RX ORDER — HYDROCODONE BITARTRATE AND ACETAMINOPHEN 5; 325 MG/1; MG/1
1 TABLET ORAL EVERY 6 HOURS PRN
Status: DISCONTINUED | OUTPATIENT
Start: 2024-07-30 | End: 2024-07-30 | Stop reason: HOSPADM

## 2024-07-30 RX ORDER — FENTANYL CITRATE 50 UG/ML
50 INJECTION, SOLUTION INTRAMUSCULAR; INTRAVENOUS ONCE
Status: COMPLETED | OUTPATIENT
Start: 2024-07-30 | End: 2024-07-30

## 2024-07-30 RX ADMIN — FENTANYL CITRATE 50 MCG: 50 INJECTION INTRAMUSCULAR; INTRAVENOUS at 15:20

## 2024-07-30 RX ADMIN — ACETAMINOPHEN 650 MG: 325 TABLET ORAL at 02:53

## 2024-07-30 RX ADMIN — HYDROCODONE BITARTRATE AND ACETAMINOPHEN 1 TABLET: 5; 325 TABLET ORAL at 11:52

## 2024-07-30 RX ADMIN — DILTIAZEM HYDROCHLORIDE 120 MG: 120 CAPSULE, EXTENDED RELEASE ORAL at 11:52

## 2024-07-30 RX ADMIN — HYDROCODONE BITARTRATE AND ACETAMINOPHEN 1 TABLET: 5; 325 TABLET ORAL at 17:42

## 2024-07-30 RX ADMIN — ACETAMINOPHEN 650 MG: 325 TABLET ORAL at 07:37

## 2024-07-30 ASSESSMENT — PAIN DESCRIPTION - LOCATION
LOCATION: SHOULDER
LOCATION: SHOULDER

## 2024-07-30 ASSESSMENT — PAIN SCALES - GENERAL
PAINLEVEL_OUTOF10: 9
PAINLEVEL_OUTOF10: 6
PAINLEVEL_OUTOF10: 7
PAINLEVEL_OUTOF10: 5
PAINLEVEL_OUTOF10: 7
PAINLEVEL_OUTOF10: 0
PAINLEVEL_OUTOF10: 3
PAINLEVEL_OUTOF10: 7

## 2024-07-30 ASSESSMENT — PAIN DESCRIPTION - DESCRIPTORS: DESCRIPTORS: ACHING

## 2024-07-30 ASSESSMENT — PAIN DESCRIPTION - ORIENTATION
ORIENTATION: RIGHT
ORIENTATION: RIGHT

## 2024-07-30 NOTE — PROGRESS NOTES
Orthopaedic Staff Note    S: Patient sitting up at bedside. Eating breakfast - comfortable, pain mild.  He is conversant and comfortable    O: RUE in sling. Dressing C/D/I    A/P; Patient admitted due to post-op pulmonary edema and hypoxia. He was successfully extubated after a couple hours in ICU yesterday and is comfortable today and stable on RA. I am OK w/DC home - He will be sleeping semi upright in recliner. Stressed importance of CPAP use. Spoke w/Dr Art - she is going to arrange for home pulse ox and supplemental O2 for CPAP if needed. He will F/U w/me in 7-10d    David Kraus MD

## 2024-07-30 NOTE — PLAN OF CARE
Problem: Skin/Tissue Integrity  Goal: Absence of new skin breakdown  Description: 1.  Monitor for areas of redness and/or skin breakdown  2.  Assess vascular access sites hourly  3.  Every 4-6 hours minimum:  Change oxygen saturation probe site  4.  Every 4-6 hours:  If on nasal continuous positive airway pressure, respiratory therapy assess nares and determine need for appliance change or resting period.  7/30/2024 1103 by Moon Douglas, RN  Outcome: Progressing

## 2024-07-30 NOTE — PROGRESS NOTES
PULSE OX ON ROOM AIR SITTING 91%   PULSE OX ON ROOM AIR AMBULATING 84%   PULSE OX ON 2 LITERS SITTING RECOVERY 94%   PULSE OX ON 2 LITERS AMBULATING RECOVERY 92%

## 2024-07-30 NOTE — CARE COORDINATION
Introduced my self and provided explanation of CM role to patient.  Patient is awake, alert, and aware of current diagnosis and treatment plan including pulse ox testing, pulm consult, probable discharge.  He voices he resides at home with his father and completes his adl's with independence. He is still employed, driving, etc.  Patient is established with a pcp and denies any issue with retail pharmaceutical coverage.  He is aware of need for home O2 therapy and after choices provided selected [facility].  Referral called to Val with [facility] who will process for delivery. Patient's father at bedside available to transport via private vehicle.  Patient indicates he will be following up with orthopedic center for OP therapy.  No other home going needs identified.  JIMMIE Valencia RN  [facility] Case Management  [phone]    Per Lola Neal cannot accept referral d/t equipment shortage.  Referral called to Terri with [employer] per choice.  Await review and response.    JIMMIE Valencia RN  [facility] Case Management  [phone]

## 2024-07-30 NOTE — PROGRESS NOTES
Critical Care Team - Daily Progress Note      Date and time: 7/30/2024 1:33 PM  Patient's name:  Jordi Burroughs  Medical Record Number: [id number]  Patient's account/billing number: [id number]  Patient's YOB: 1970  Age: 54 y.o.  Date of Admission: 7/29/2024 11:50 AM  Length of stay during current admission: 1      Primary Care Physician: Juan Fisher MD  ICU Attending Physician: Dr. Art    Code Status: Prior    Reason for ICU admission: Acute hypoxic respiratory failure.     SUBJECTIVE:     OVERNIGHT EVENTS:    Patient extubated yesterday.  Patient tolerated diet.  Patient with with appropriate amount of urine output.  Plan to downgrade patient today.        Intake/Output:   No intake/output data recorded.  I/O last 3 completed shifts:  In: 18.3 [I.V.:18.3]  Out: 3650 [Urine:3650]    Awake and following commands: Yes  Current Ventilation: - No ventilator support  Secretions: none  Sedation: none  Paralyzed: No  Vasopressors: None    ROS:  Unless stated above, ROS is otherwise negative.    Initial HPI + past overnight events:   The patient is a 54 y.o. 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    Per report from the emergency room they had attempted to extubate patient postoperatively and he had become hypoxic.  And they had reintubated him and transferred to our emergency room.     Chest x-ray in the emergency room showed bilateral pulmonary edema.  Initial ABG revealed acute hypoxic and hypercapnic respiratory failure with a pH of 7.168/pCO2 72.7/PaO2 of 63.8/bicarb of 26.4    7/30: Patient was extubated shortly after arrival to ICU.  Patient tolerated diet, appropriate amount of urine output.  Patient was ambulated and had mild  review.     Total critical care time caring for this patient with life threatening, unstable organ failure, including direct patient contact, management of life support systems, review of data including imaging and labs, discussions with other team members and physicians is at least 35 Min so far today, excluding procedures.      Gely Art MD

## 2024-07-30 NOTE — PATIENT CARE CONFERENCE
Intensive Care Daily Quality Rounding Checklist      ICU Team Members: Dr. Art, resident, charge nurse, bedside nurse and respiratory therapy     ICU Day #: NUMBER: 2    SOFA Score: 6    Intubation Date:      Ventilator Day #:     Central Line Insertion Date:          Day #:         Indication:      Arterial Line Insertion Date:        Day #:     Temporary Hemodialysis Catheter Insertion Date:        Day #     DVT Prophylaxis:    GI Prophylaxis:    Fields Catheter Insertion Date: July 29       Day #: 2      Indications: Need for fluid management of the critically ill patient in a critical care setting      Continued need (if yes, reason documented and discussed with physician): yes    Skin Issues/ Wounds and ordered treatment discussed on rounds: N    Goals/ Plans for the Day: Ambulatory pulse ox test, continue home CPAP at , transfer to [facility] or discharge    Reviewed plan and goals for day with patient and/or representative: Yes

## 2024-07-30 NOTE — PROGRESS NOTES
PULSE OX ON ROOM AIR SITTING 92%   PULSE OX ON ROOM AIR AMBULATING 82%    Patient returned to chair and placed home CPAP on - pulse ox 94%

## 2024-07-30 NOTE — PLAN OF CARE
Problem: Discharge Planning  Goal: Discharge to home or other facility with appropriate resources  Outcome: Progressing     Problem: Skin/Tissue Integrity  Goal: Absence of new skin breakdown  Description: 1.  Monitor for areas of redness and/or skin breakdown  2.  Assess vascular access sites hourly  3.  Every 4-6 hours minimum:  Change oxygen saturation probe site  4.  Every 4-6 hours:  If on nasal continuous positive airway pressure, respiratory therapy assess nares and determine need for appliance change or resting period.  Outcome: Progressing     Problem: Safety - Adult  Goal: Free from fall injury  Outcome: Progressing  Flowsheets (Taken 7/29/2024 2033)  Free From Fall Injury: Instruct family/caregiver on patient safety

## 2024-07-30 NOTE — H&P
[facility]  History and Physical      CHIEF COMPLAINT:    Chief Complaint   Patient presents with    Respiratory Arrest        Patient of Juan Fisher MD presents with:  Acute hypoxemic respiratory failure (HCC)    History of Present Illness:   Patient is a 54-year-old male with a past medical history of A-fib, hyperlipidemia, hypertension.  Patient presented to the ED from outpatient or facility with postanesthesia hypoxia that required reintubation.  Patient had shoulder surgery on 7/29/2024 and when they attempted to extubate him patient became hypoxic requiring reintubation.  Patient was awake upon arrival to the ED.  ER workup revealed ABG with pCO2 of 72.7, pH 7.168, , slightly elevated liver enzymes which is improving, leukocytosis 14.2 likely reactive.  Patient was admitted to the ICU for further treatment.  On evaluation he is breathing easy at his baseline denies any acute complaints.  Right arm in sling status postprocedure    REVIEW OF SYSTEMS:  Pertinent negatives are above in HPI.  10 point ROS otherwise negative.      Past Medical History:   Diagnosis Date    Atrial fibrillation (HCC)     Cancer (HCC)     lt ear basal cell    Hyperlipidemia     Hypertension          Past Surgical History:   Procedure Laterality Date    ANTERIOR CRUCIATE LIGAMENT REPAIR  age 13    right    OTHER SURGICAL HISTORY Left 08/23/2016    excision of left poat auricular basal cell carcanoma        Medications Prior to Admission:    Medications Prior to Admission: [DISCONTINUED] potassium chloride (KLOR-CON) 8 MEQ extended release tablet,   diltiazem (CARDIZEM CD) 120 MG extended release capsule, Take 1 capsule by mouth daily  [DISCONTINUED] apixaban (ELIQUIS) 5 MG TABS tablet, Take 1 tablet by mouth 2 times daily  atorvastatin (LIPITOR) 20 MG tablet, Take 1 tablet by mouth daily  acetaminophen (TYLENOL ARTHRITIS PAIN) 650 MG CR tablet, Take 2 tablets by mouth nightly  Cholecalciferol (VITAMIN D)

## 2024-07-31 LAB
MICROORGANISM SPEC CULT: NORMAL
SPECIMEN DESCRIPTION: NORMAL

## 2024-08-07 NOTE — PROGRESS NOTES
Physician Progress Note      PATIENT:               MARK CYR  [facility] #:                  [id number]  :                       1970  ADMIT DATE:       2024 11:50 AM  DISCH DATE:        2024 6:45 PM  RESPONDING  PROVIDER #:        Clarisa Lima MD          QUERY TEXT:    Pt admitted with respiratory failure. Noted documentation of Acute hypoxemic   respiratory failure from flash pulmonary edema postoperatively per [facility].? Please   document in progress notes and discharge summary if you are   evaluating/treating any of the following:    The medical record reflects the following:  Risk Factors: LEMUEL, Afib, home CPAP  Clinical Indicators: CXR- Cardiomegaly with increased markings seen in the   perihilar regions  bilaterally suggesting either interstitial edema or pneumonia. Endotracheal   tube in satisfactory position above the janet. Critical Care cons-   Postoperative acute hypoxic and hypercapnic failure most likely secondary not   complete reversal from anesthesia and having underlying LEMUEL. Repeat ABG shows   significant improvement in acidosis. Placed patient on pressure support   hopefully we can extubate him within the next hour.  PN- A/P; Patient   admitted due to post-op pulmonary edema and hypoxia. He was successfully   extubated after a couple hours in ICU yesterday and is comfort  Treatment: ETT placed PTA, mechanical ventilation, consults, monitoring, ABG,   CXR, s/p diuresis    Thank you,  Deidra Pond RN, CRCR  Clinical Documentation Improvement  Options provided:  -- Respiratory failure is due to chronic underlying condition of LEMUEL and is   not due to the procedure  -- Postoperative Respiratory failure is due to the procedure  -- Mechanical ventilation support after procedure without respiratory failure  -- Postoperative Respiratory failure related to incomplete reversal of   anesthesia  -- Postoperative Respiratory failure related to incomplete reversal of   probable anesthesia and

## 2025-07-22 ENCOUNTER — HOSPITAL ENCOUNTER (OUTPATIENT)
Dept: CT IMAGING | Age: 55
Discharge: HOME | End: 2025-07-24
Payer: COMMERCIAL

## 2025-07-22 DIAGNOSIS — R07.9 CHEST PAIN RADIATING TO JAW: ICD-10-CM

## 2025-07-22 DIAGNOSIS — R06.02 SHORTNESS OF BREATH: ICD-10-CM

## 2025-07-22 PROCEDURE — 6360000004 HC RX CONTRAST MEDICATION: Performed by: RADIOLOGY

## 2025-07-22 PROCEDURE — 71275 CT ANGIOGRAPHY CHEST: CPT

## 2025-07-22 PROCEDURE — 2500000003 HC RX 250 WO HCPCS: Performed by: RADIOLOGY

## 2025-07-22 RX ORDER — SODIUM CHLORIDE 0.9 % (FLUSH) 0.9 %
10 SYRINGE (ML) INJECTION PRN
Status: DISCONTINUED | OUTPATIENT
Start: 2025-07-22 | End: 2025-07-25 | Stop reason: HOSPADM

## 2025-07-22 RX ORDER — IOPAMIDOL 755 MG/ML
75 INJECTION, SOLUTION INTRAVASCULAR
Status: COMPLETED | OUTPATIENT
Start: 2025-07-22 | End: 2025-07-22

## 2025-07-22 RX ADMIN — IOPAMIDOL 75 ML: 755 INJECTION, SOLUTION INTRAVENOUS at 13:21

## 2025-07-22 RX ADMIN — SODIUM CHLORIDE, PRESERVATIVE FREE 10 ML: 5 INJECTION INTRAVENOUS at 13:21

## 2025-08-07 ENCOUNTER — HOSPITAL ENCOUNTER (OUTPATIENT)
Dept: CARDIOLOGY | Age: 55
Discharge: HOME | End: 2025-08-09
Payer: COMMERCIAL

## 2025-08-07 VITALS
BODY MASS INDEX: 37.74 KG/M2 | WEIGHT: 249 LBS | DIASTOLIC BLOOD PRESSURE: 82 MMHG | SYSTOLIC BLOOD PRESSURE: 149 MMHG | HEIGHT: 68 IN

## 2025-08-07 DIAGNOSIS — R06.02 SOB (SHORTNESS OF BREATH): ICD-10-CM

## 2025-08-07 PROCEDURE — 93306 TTE W/DOPPLER COMPLETE: CPT

## 2025-08-08 LAB
ECHO AO ASC DIAM: 2.4 CM
ECHO AO ASCENDING AORTA INDEX: 1.07 CM/M2
ECHO AV AREA PEAK VELOCITY: 2 CM2
ECHO AV AREA VTI: 1.9 CM2
ECHO AV AREA/BSA PEAK VELOCITY: 0.9 CM2/M2
ECHO AV AREA/BSA VTI: 0.8 CM2/M2
ECHO AV CUSP MM: 2 CM
ECHO AV MEAN GRADIENT: 4 MMHG
ECHO AV MEAN VELOCITY: 0.9 M/S
ECHO AV PEAK GRADIENT: 7 MMHG
ECHO AV PEAK VELOCITY: 1.3 M/S
ECHO AV VELOCITY RATIO: 0.54
ECHO AV VTI: 25 CM
ECHO BSA: 2.33 M2
ECHO EST RA PRESSURE: 8 MMHG
ECHO LA DIAMETER INDEX: 1.88 CM/M2
ECHO LA DIAMETER: 4.2 CM
ECHO LA VOL A-L A2C: 67 ML (ref 18–58)
ECHO LA VOL A-L A4C: 77 ML (ref 18–58)
ECHO LA VOL MOD A2C: 66 ML (ref 18–58)
ECHO LA VOL MOD A4C: 71 ML (ref 18–58)
ECHO LA VOLUME AREA LENGTH: 72 ML
ECHO LA VOLUME INDEX A-L A2C: 30 ML/M2 (ref 16–34)
ECHO LA VOLUME INDEX A-L A4C: 34 ML/M2 (ref 16–34)
ECHO LA VOLUME INDEX AREA LENGTH: 32 ML/M2 (ref 16–34)
ECHO LA VOLUME INDEX MOD A2C: 29 ML/M2 (ref 16–34)
ECHO LA VOLUME INDEX MOD A4C: 32 ML/M2 (ref 16–34)
ECHO LV EDV A2C: 178 ML
ECHO LV EDV A4C: 203 ML
ECHO LV EDV BP: 192 ML (ref 67–155)
ECHO LV EDV INDEX A4C: 91 ML/M2
ECHO LV EDV INDEX BP: 86 ML/M2
ECHO LV EDV NDEX A2C: 79 ML/M2
ECHO LV EF PHYSICIAN: 40 %
ECHO LV EJECTION FRACTION A2C: 45 %
ECHO LV EJECTION FRACTION A4C: 47 %
ECHO LV EJECTION FRACTION BIPLANE: 46 % (ref 55–100)
ECHO LV ESV A2C: 98 ML
ECHO LV ESV A4C: 107 ML
ECHO LV ESV BP: 104 ML (ref 22–58)
ECHO LV ESV INDEX A2C: 44 ML/M2
ECHO LV ESV INDEX A4C: 48 ML/M2
ECHO LV ESV INDEX BP: 46 ML/M2
ECHO LV FRACTIONAL SHORTENING: 22 % (ref 28–44)
ECHO LV INTERNAL DIMENSION DIASTOLE INDEX: 2.59 CM/M2
ECHO LV INTERNAL DIMENSION DIASTOLIC: 5.8 CM (ref 4.2–5.9)
ECHO LV INTERNAL DIMENSION SYSTOLIC INDEX: 2.01 CM/M2
ECHO LV INTERNAL DIMENSION SYSTOLIC: 4.5 CM
ECHO LV IVSD: 0.9 CM (ref 0.6–1)
ECHO LV IVSS: 1.2 CM
ECHO LV MASS 2D: 218.1 G (ref 88–224)
ECHO LV MASS INDEX 2D: 97.4 G/M2 (ref 49–115)
ECHO LV POSTERIOR WALL DIASTOLIC: 1 CM (ref 0.6–1)
ECHO LV POSTERIOR WALL SYSTOLIC: 1.2 CM
ECHO LV RELATIVE WALL THICKNESS RATIO: 0.34
ECHO LVOT AREA: 3.5 CM2
ECHO LVOT AV VTI INDEX: 0.54
ECHO LVOT DIAM: 2.1 CM
ECHO LVOT MEAN GRADIENT: 1 MMHG
ECHO LVOT PEAK GRADIENT: 2 MMHG
ECHO LVOT PEAK VELOCITY: 0.7 M/S
ECHO LVOT STROKE VOLUME INDEX: 20.7 ML/M2
ECHO LVOT SV: 46.4 ML
ECHO LVOT VTI: 13.4 CM
ECHO MV "A" WAVE DURATION: 90 MSEC
ECHO MV A VELOCITY: 0.5 M/S
ECHO MV AREA PHT: 4.9 CM2
ECHO MV AREA VTI: 2.4 CM2
ECHO MV E DECELERATION TIME (DT): 119 MS
ECHO MV E VELOCITY: 0.89 M/S
ECHO MV E/A RATIO: 1.78
ECHO MV LVOT VTI INDEX: 1.46
ECHO MV MAX VELOCITY: 1.1 M/S
ECHO MV MEAN GRADIENT: 2 MMHG
ECHO MV MEAN VELOCITY: 0.6 M/S
ECHO MV PEAK GRADIENT: 5 MMHG
ECHO MV PRESSURE HALF TIME (PHT): 44.6 MS
ECHO MV REGURGITANT VELOCITY PISA: 5.1 M/S
ECHO MV REGURGITANT VTIA: 155.1 CM
ECHO MV VTI: 19.6 CM
ECHO PULMONARY ARTERY SYSTOLIC PRESSURE (PASP): 45 MMHG
ECHO PV MAX VELOCITY: 1 M/S
ECHO PV MEAN GRADIENT: 2 MMHG
ECHO PV MEAN VELOCITY: 0.7 M/S
ECHO PV PEAK GRADIENT: 4 MMHG
ECHO PV VTI: 16.3 CM
ECHO RIGHT VENTRICULAR SYSTOLIC PRESSURE (RVSP): 45 MMHG
ECHO RV INTERNAL DIMENSION: 3.5 CM
ECHO TV REGURGITANT MAX VELOCITY: 3.05 M/S
ECHO TV REGURGITANT PEAK GRADIENT: 37 MMHG